# Patient Record
Sex: FEMALE | Race: WHITE | Employment: STUDENT | URBAN - METROPOLITAN AREA
[De-identification: names, ages, dates, MRNs, and addresses within clinical notes are randomized per-mention and may not be internally consistent; named-entity substitution may affect disease eponyms.]

---
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
## 2025-02-17 ENCOUNTER — APPOINTMENT (OUTPATIENT)
Dept: BEHAVIORAL HEALTH | Facility: CLINIC | Age: 20
End: 2025-02-17
Payer: COMMERCIAL

## 2025-02-17 DIAGNOSIS — F90.2 ATTENTION DEFICIT HYPERACTIVITY DISORDER (ADHD), COMBINED TYPE: ICD-10-CM

## 2025-02-17 DIAGNOSIS — F32.0 CURRENT MILD EPISODE OF MAJOR DEPRESSIVE DISORDER WITHOUT PRIOR EPISODE (CMS-HCC): ICD-10-CM

## 2025-02-17 DIAGNOSIS — F95.2 TOURETTE'S DISORDER: ICD-10-CM

## 2025-02-17 DIAGNOSIS — F41.9 ANXIETY: ICD-10-CM

## 2025-02-17 DIAGNOSIS — F84.0 AUTISTIC DISORDER (HHS-HCC): ICD-10-CM

## 2025-02-17 RX ORDER — METHYLPHENIDATE HYDROCHLORIDE 18 MG/1
18 TABLET ORAL EVERY MORNING
Qty: 30 TABLET | Refills: 0 | Status: SHIPPED | OUTPATIENT
Start: 2025-02-17

## 2025-02-17 NOTE — PROGRESS NOTES
Outpatient IDD Clinic- Follow up Evaluation    Margarita Sandoval is a 19 y.o. year old female patient with a chief complaint of   Chief Complaint   Patient presents with    Autism    ADHD    Anxiety    MDD (Major Depressive Disorder)    Tourette Syndrome    presenting to outpatient treatment for a scheduled psych outpatient psychiatric follow-up.    Virtual or Telephone Consent    An interactive audio and video telecommunication system which permits real time communications between the patient (at the originating site) and provider (at the distant site) was utilized to provide this telehealth service.   Verbal consent was requested and obtained from Margarita Sandoval on this date, 02/17/25 for a telehealth visit and the patient's location was confirmed at the time of the visit.    PRESENT FOR APPOINTMENT  Mom, Margarita Sandoval    HPI:  Margarita is graduating from school in May;  is home schooled by mom. Not currently working, goes to a day program once weekly which is an art program; does painting and drawing, field trips.     Has a hx of POTS, was working but had to stop due to her POTS.    Tics are well controlled currently, clonidine has been helpful. Mom has not noticed tics in the last several months. Margarita will have hand and shoulder movements and vocal tics.    Margarita reports having a hard time doing her school work. Reports difficulty with focus and attention. Was on Focalin and as the dose was increased she had increased agitation. Since stopping Focalin she has returned to baseline. Grades are average; cannot concentrate and got locked out of a program due to inability to concentrate.    Mood depends, fluctuates. Denies SIB.    Denies SI/HI and passive thoughts of death.     Thinks she sleeps good; sometimes has a hard time falling asleep. Unable to state how long it takes, mom thinks it can be a couple of hours.     Margarita will have periods of time where she gets very talkative, will have a decrease in sleep, and more  impulsive. Can last a few days to a week. Gets very agitated with menses, will yell. Had the Nexplanon placed 2/5/25.     Margarita likes to play video games, playing sports and animals. Rides horses at a local stable. She also takes care of her animals; has chickens, bunnies, cats and dogs.     Mom reports Margarita has had numerous ear infections, strep and UTIs in childhood. 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 Within two days of surgery she started having hallucinations and fits of rages right after. Tics got severe during that time. Saw neurology and was sent to a PANDAS specialist. Was on an antibiotic for 1.5 yrs. All sx resolved except for tics and POTS.    Saw a naturopathic provider; Margarita has higher that normal histamine levels and multiple allergies. Has a type of POTS that she might outgrow. Now seeing adult cardiology, passed out 5 times in two months.     Psychiatric Review Of Systems:  Depressive Symptoms: negative  Manic Symptoms: negative  Anxiety Symptoms: General Anxiety Disorder (BRANDEN)BRANDEN Behaviors: manageable  Psychotic Symptoms:  Hx of visual hallucinations. Reports occasional visual hallucinations  Trauma Symptoms: None  Other Symptoms/Concerns:Motor/vocal tics  Delirium/Altered Mental Status Symptoms:Diminished ability to focus, sustain, shift attention    Current Medications:    Current Outpatient Medications:     cloNIDine (Catapres) 0.1 mg tablet, Take half tablet in the morning, half tablet at midday, full tablet at night, Disp: 225 tablet, Rfl: 1    methylphenidate ER (Concerta) 18 mg extended release tablet, Take 1 tablet (18 mg) by mouth once daily in the morning. Do not crush, chew, or split., Disp: 30 tablet, Rfl: 0    sertraline (Zoloft) 100 mg tablet, Take 1 tablet (100 mg) by mouth once daily., Disp: 90 tablet, Rfl: 1    sertraline (Zoloft) 50 mg tablet, Take 1 tablet (50 mg) by mouth once daily., Disp: 90 tablet, Rfl: 1    traZODone (Desyrel) 100 mg tablet, Take 1 tablet (100 mg) by  mouth as needed at bedtime for sleep., Disp: 90 tablet, Rfl: 1    Medical History:  History reviewed. No pertinent past medical history.    Past Psychiatric History:   Diagnoses: Autism, ADHD, tic disorder, anxiety, depresson  Previous Psychiatrist: Dr De Leon; [facility].  Therapy:  Weekly, [facility]  Past psychiatric medications: Risperdal, abilify, tegretol, concerta, impipramine, daytrana, clonidine, straterra, Adderall, Latuda, lithium, Focalin  guanfacine-nausea   Past psychiatric treatments: denies  Hospitalizations: denies  Suicide attempts: denies    Family psychiatric history:  Maternal grandparent- anxiety, depression  Father- anxiety  Paternal grandparents- alcohol abuse    Social History:   Currently lives: At home,lives with siblings and parents  Parents:   Siblings: one younger sister 17, two older brothers 22,24  Birth: Margarita was FT infant, .   Development: Walking and talking was not delayed, has processing delays and reading was delayed.   School: Home schooled  Occupations: Worked a [employer], not employed currently  Relationship: engaged  History of violence: had physical aggression after surgery when having hallucinations  Access to Weapons: has firearms at home but locked up  Guardian/POA/Payee:  Self, mom POA    Substance Use History:  Tobacco use: denies  Use of alcohol: denied  Use of caffeine: coffee 1 /day and occasional soft drink  Use of other substances: denies    Record Review: moderate     Medical Review Of Systems:  A comprehensive review of systems was negative except for: Respiratory: positive for Symptoms; Respiratory w/o Neg: cough  Gastrointestinal: positive for dyspepsia    MEDICAL HISTORY  -PCP: Cally Elliott, PhD  -Pt reports currently is not pregnant and uses birth control. Nexplanon. LMP: 25    -TBI/head trauma/LOC/seizure hx: hx concussion at 3 yo and 3 yo    Objective   Mental Status Exam  Appearance: Margarita has multi  colored hair pulled up and wears glasses. She is neat and clean in appearance  Attitude: Calm, guarded   Behavior: Intermittent eye contact.   Motor Activity: No psychomotor agitation or retardation. No abnormal movements, tremors or tics noted during visit. No evidence of extrapyramidal symptoms or tardive dyskinesia.  Speech: Regular rate, rhythm, volume. Spontaneous, no pressured speech.  Mood: fluctuates  Affect: restricted, guarded, mood congruent.  Thought Process: Linear, logical, and goal-directed. No loose associations or gross thought disorganization.  Thought Content: Denied current suicidal ideation or thoughts of harm to self, denied homicidal ideation or thoughts of harm to others. No delusional thinking elicited. No perseverations or obsessions identified.   Perception: Did not endorse auditory or visual hallucinations, did not appear to be responding to hallucinatory stimuli.   Cognition: Alert, oriented x3. Adequate fund of knowledge. No deficits in language.   Insight: Fair, in regards to understanding mental health condition  Judgement: Fair      Vitals:  There were no vitals filed for this visit.    Risk Assessment:  Risk of harm to self: Low Risk -- Risk factors include: History of impulsivity and/or aggressive behavior  and Severe anxiety Protective factors include:Denies current suicidal ideation, Denies history of suicide attempts , Willingness to seek help and support , Gender, Receiving and engaged in care for mental, physical, and substance use disorders , History of adhering to treatment recommendations and/or prescribed medication regimen , Support through ongoing medical and mental healthcare relationships , Interpersonal relationships and supports, e.g., family, friends, peers, community , and Restricted access to firearms or other lethal means of suicide     Risk of harm to others: Medium Risk - Risk factors include: No significant risk factors identified on screening. Protective  factors include: Lack of known history of harm to others , Lack of known history of violent ideation , and Lack of known access to firearms       Margarita was seen today for autism, adhd, anxiety, mdd (major depressive disorder) and tourette syndrome.  Diagnoses and all orders for this visit:  Autistic disorder ([facility])  -     Follow Up In Psychiatry  -     Follow Up In Psychiatry; Future  Attention deficit hyperactivity disorder (ADHD), combined type  -     methylphenidate ER (Concerta) 18 mg extended release tablet; Take 1 tablet (18 mg) by mouth once daily in the morning. Do not crush, chew, or split.  Tourette's disorder  Anxiety  Current mild episode of major depressive disorder without prior episode (CMS-HCC)       Medical Decision Making:   Margarita Sandoval is a 19 y.o. female with ADHD, anxiety ASD, Tic Disorder and concern for PANDAs who was seen today to establish care with a new provider. Was previously seen by Dr eD Leon through the IDD clinic.   Margarita reports difficulty with concentration and focus; she was trialed on various stimulant medications which increased agitation. She is currently taking clonidine 0.1 mg one half tab in the morning, one half tab at midday, and 1 full tab at night for tic disorder, Zoloft 150 mg daily for anxiety and depression, and trazodone 100 mg at bedtime for sleep.  Margarita has a known history of psychogenic seizures, last one was when she was working at [employer].  She is stable on current medication regimen.   Discussed treatment plan, including trialing Concerta for ADHD symptoms.  She was most recently on Focalin which caused increased agitation.  Will start Concerta ER 18 mg daily and continue remaining medication regimen.  Plan to follow-up in 6 weeks.    Impression:   ASD  ADHD  Anxiety  Tic disorder vs. Tourette's syndrome  History psychogenic seizures  History of depression    Plan/Recommendations:  Medications:    -Start Concerta ER 18 mg daily for ADHD   -Continue  clonidine 0.1 mg one half tab in the morning, one half tab at midday, and 1 full tab at bedtime Tic disorder   -Continue sertraline 150 mg daily for anxiety and depression   -Continue trazodone 100 mg nightly as needed for sleep  Orders: Go to any  lab for blood work to be completed  Follow up: In 6 weeks as scheduled  Call  Psychiatry at [phone] with issues.  For [facility] residents, [employer] is a 24/7 hotline you can call for assistance [193.507.7130]. Please call [phone] or go to your closest Emergency Room if you feel unsafe. This includes thoughts of hurting yourself or anyone else, or having other troubles such as hearing voices, seeing visions, or having new and scary thoughts about the people around you.    Review with patient: Treatment plan reviewed with the patient.  Medication risks/benefit reviewed with the patient and mom    Time Spent:  Prep time: 11 min  Direct patient time: 55 min  Documentation time: 12 min   Total time: 78 min    KENYATTA Suresh-CNP

## 2025-02-18 ENCOUNTER — APPOINTMENT (OUTPATIENT)
Dept: BEHAVIORAL HEALTH | Facility: CLINIC | Age: 20
End: 2025-02-18
Payer: COMMERCIAL

## 2025-02-19 ENCOUNTER — TELEPHONE (OUTPATIENT)
Dept: BEHAVIORAL HEALTH | Facility: CLINIC | Age: 20
End: 2025-02-19

## 2025-02-19 NOTE — PROGRESS NOTES
PROVIDER: mohan  MEDICATION/DOSAGE: methlphenidate HCI ER 18mg  QTY/SUPPLY: 30/30  PRIOR AUTH COMPLETED VIA: via phone  INSURANCE:  [employer]  REF #/KEY:  STATUS approved  BIN#               [id number]   ID#                      [id number]

## 2025-03-02 NOTE — PATIENT INSTRUCTIONS
Plan/Recommendations:  Medications:    -Start Concerta ER 18 mg daily for ADHD   -Continue clonidine 0.1 mg one half tab in the morning, one half tab at midday, and 1 full tab at bedtime Tic disorder   -Continue sertraline 150 mg daily for anxiety and depression   -Continue trazodone 100 mg nightly as needed for sleep  Orders: Go to any  lab for blood work to be completed  Follow up: In 6 weeks as scheduled  Call  Psychiatry at [phone] with issues.  For [facility] residents, [employer] is a 24/7 hotline you can call for assistance [509.645.6668]. Please call [phone] or go to your closest Emergency Room if you feel unsafe. This includes thoughts of hurting yourself or anyone else, or having other troubles such as hearing voices, seeing visions, or having new and scary thoughts about the people around you.

## 2025-04-16 ENCOUNTER — APPOINTMENT (OUTPATIENT)
Dept: BEHAVIORAL HEALTH | Facility: CLINIC | Age: 20
End: 2025-04-16

## 2025-04-16 DIAGNOSIS — F84.0 AUTISTIC DISORDER (HHS-HCC): ICD-10-CM

## 2025-04-16 DIAGNOSIS — F41.9 ANXIETY: ICD-10-CM

## 2025-04-16 DIAGNOSIS — F95.2 TOURETTE'S DISORDER: ICD-10-CM

## 2025-04-16 DIAGNOSIS — F90.2 ATTENTION DEFICIT HYPERACTIVITY DISORDER (ADHD), COMBINED TYPE: ICD-10-CM

## 2025-04-16 PROCEDURE — 99215 OFFICE O/P EST HI 40 MIN: CPT

## 2025-04-16 RX ORDER — CLONIDINE HYDROCHLORIDE 0.1 MG/1
TABLET ORAL
Qty: 225 TABLET | Refills: 1 | Status: SHIPPED | OUTPATIENT
Start: 2025-04-16

## 2025-04-16 RX ORDER — SERTRALINE HYDROCHLORIDE 100 MG/1
100 TABLET, FILM COATED ORAL DAILY
Qty: 90 TABLET | Refills: 1 | Status: SHIPPED | OUTPATIENT
Start: 2025-04-16 | End: 2025-10-13

## 2025-04-16 RX ORDER — TRAZODONE HYDROCHLORIDE 100 MG/1
100 TABLET ORAL NIGHTLY PRN
Qty: 90 TABLET | Refills: 1 | Status: SHIPPED | OUTPATIENT
Start: 2025-04-16 | End: 2025-10-13

## 2025-04-16 RX ORDER — SERTRALINE HYDROCHLORIDE 50 MG/1
50 TABLET, FILM COATED ORAL DAILY
Qty: 90 TABLET | Refills: 1 | Status: SHIPPED | OUTPATIENT
Start: 2025-04-16 | End: 2025-10-13

## 2025-04-16 RX ORDER — METHYLPHENIDATE HYDROCHLORIDE 27 MG/1
27 TABLET ORAL EVERY MORNING
Qty: 30 TABLET | Refills: 0 | Status: SHIPPED | OUTPATIENT
Start: 2025-04-16

## 2025-04-16 NOTE — PROGRESS NOTES
Outpatient [facility]- Follow up Evaluation    Margarita Sandoval is a 19 y.o. year old female patient with a chief complaint of   No chief complaint on file.   presenting to outpatient treatment for a scheduled psych outpatient psychiatric follow-up.    Virtual or Telephone Consent    An interactive audio and video telecommunication system which permits real time communications between the patient (at the originating site) and provider (at the distant site) was utilized to provide this telehealth service.   Verbal consent was requested and obtained from Margarita Sandoval on this date, 04/16/25 for a telehealth visit and the patient's location was confirmed at the time of the visit.      PRESENT FOR APPOINTMENT  Dad, Toribio Leemarvin    HPI:  Margarita states she doesn't know how things are going.  She is due to graduate in May.     Was started on Concerta at last visit; noticed a little bit of improvement with focus and attention. Denies agitation on Concerta at the starter dose but has since run out. Denies side effects.     Last couple of weeks dad reports Margarita has been really edgy, jumps to anger quickly. Might have been different on Concerta, was not as defiant.     Denies depressive sx or Denies SI/HI and passive thoughts of death.     Denies problems with anxiety.     Sleep is fine; denies difficulty falling and staying asleep.     Working with [employer] on job search and placement. Has not done job exploration or experience yet.    Tics well controlled with clonidine but ran out of her supply of medication.     Has not had any issues with passing out from POTS; saw cardiology, still going through testing.       Per previous HPI:  Margarita is graduating from school in May;  is home schooled by mom. Not currently working, goes to a day program once weekly which is an art program; does painting and drawing, field trips.     Has a hx of POTS, was working but had to stop due to her POTS.    Tics are well controlled currently, clonidine  has been helpful. Mom has not noticed tics in the last several months. Margarita will have hand and shoulder movements and vocal tics.    Margarita reports having a hard time doing her school work. Reports difficulty with focus and attention. Was on Focalin and as the dose was increased she had increased agitation. Since stopping Focalin she has returned to baseline. Grades are average; cannot concentrate and got locked out of a program due to inability to concentrate.    Mood depends, fluctuates. Denies SIB.    Denies SI/HI and passive thoughts of death.     Thinks she sleeps good; sometimes has a hard time falling asleep. Unable to state how long it takes, mom thinks it can be a couple of hours.     Margarita will have periods of time where she gets very talkative, will have a decrease in sleep, and more impulsive. Can last a few days to a week. Gets very agitated with menses, will yell. Had the Nexplanon placed 2/5/25.     Margarita likes to play video games, playing sports and animals. Rides horses at a local stable. She also takes care of her animals; has chickens, bunnies, cats and dogs.     Mom reports Margarita has had numerous ear infections, strep and UTIs in childhood. 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 Within two days of surgery she started having hallucinations and fits of rages right after. Tics got severe during that time. Saw neurology and was sent to a PANDAS specialist. Was on an antibiotic for 1.5 yrs. All sx resolved except for tics and POTS.    Saw a naturopathic provider; Margarita has higher that normal histamine levels and multiple allergies. Has a type of POTS that she might outgrow. Now seeing adult cardiology, passed out 5 times in two months.     Psychiatric Review Of Systems:  Depressive Symptoms: negative  Manic Symptoms: negative  Anxiety Symptoms: General Anxiety Disorder (BRANDEN)BRANDEN Behaviors: manageable  Psychotic Symptoms:  Hx of visual hallucinations. Reports occasional visual hallucinations  Trauma  Symptoms: None  Other Symptoms/Concerns:Motor/vocal tics  Delirium/Altered Mental Status Symptoms:Diminished ability to focus, sustain, shift attention    Current Medications:    Current Outpatient Medications:     cloNIDine (Catapres) 0.1 mg tablet, Take half tablet in the morning, half tablet at midday, full tablet at night, Disp: 225 tablet, Rfl: 1    methylphenidate ER (Concerta) 18 mg extended release tablet, Take 1 tablet (18 mg) by mouth once daily in the morning. Do not crush, chew, or split., Disp: 30 tablet, Rfl: 0    sertraline (Zoloft) 100 mg tablet, Take 1 tablet (100 mg) by mouth once daily., Disp: 90 tablet, Rfl: 1    sertraline (Zoloft) 50 mg tablet, Take 1 tablet (50 mg) by mouth once daily., Disp: 90 tablet, Rfl: 1    traZODone (Desyrel) 100 mg tablet, Take 1 tablet (100 mg) by mouth as needed at bedtime for sleep., Disp: 90 tablet, Rfl: 1    Medical History:  No past medical history on file.    Past Psychiatric History:   Diagnoses: Autism, ADHD, tic disorder, anxiety, depresson  Previous Psychiatrist: Dr De Leon; Cj; [facility] and [facility].  Therapy:  Weekly, [facility] [facility]  Past psychiatric medications: Risperdal, abilify, tegretol, concerta, impipramine, daytrana, clonidine, straterra, Adderall, Latuda, lithium, Focalin  guanfacine-nausea   Past psychiatric treatments: denies  Hospitalizations: denies  Suicide attempts: denies    Family psychiatric history:  Maternal grandparent- anxiety, depression  Father- anxiety  Paternal grandparents- alcohol abuse    Social History:   Currently lives: At home,lives with siblings and parents  Parents:   Siblings: one younger sister 17, two older brothers 22,24  Birth: Margarita was [employer] infant, .   Development: Walking and talking was not delayed, has processing delays and reading was delayed.   School: Home schooled  Occupations: Worked a [employer], not employed currently  Relationship: engaged  History of violence: had physical aggression  "after surgery when having hallucinations  Access to Weapons: has firearms at home but locked up  Guardian/POA/Payee:  Self, mom POA    Substance Use History:  Tobacco use: denies  Use of alcohol: denied  Use of caffeine: coffee 1 /day and occasional soft drink  Use of other substances: denies    Record Review: brief     Medical Review Of Systems:  A comprehensive review of systems was negative except for: Respiratory: positive for Symptoms; Respiratory w/o Neg: cough  Gastrointestinal: positive for diarrhea and dyspepsia    MEDICAL HISTORY  -PCP: Cally Elliott, PhD  -Pt reports currently is not pregnant and uses birth control. Nexplanon. LMP: 4/15/25    -TBI/head trauma/LOC/seizure hx: hx concussion at 3 yo and 3 yo    Objective   Mental Status Exam  Appearance: Margarita has multi colored hair pulled up and wears glasses. She is neat and clean in appearance  Attitude: Calm; will get up and leave room at times  Behavior: Intermittent eye contact.   Motor Activity: No psychomotor agitation or retardation. No abnormal movements, tremors or tics noted during visit. No evidence of extrapyramidal symptoms or tardive dyskinesia.  Speech: Regular rate, rhythm, volume. Spontaneous, no pressured speech.  Mood: \"OK\"  Affect: restricted, mood congruent.  Thought Process: Linear, logical, and goal-directed. No loose associations or gross thought disorganization.  Thought Content: Denied current suicidal ideation or thoughts of harm to self, denied homicidal ideation or thoughts of harm to others. No delusional thinking elicited. No perseverations or obsessions identified.   Perception: Did not endorse auditory or visual hallucinations, did not appear to be responding to hallucinatory stimuli.   Cognition: Alert, oriented x3. Adequate fund of knowledge. No deficits in language.   Insight: Fair, in regards to understanding mental health condition  Judgement: Fair      Vitals:  There were no vitals filed for this visit.    Risk " Assessment:  Risk of harm to self: Low Risk -- Risk factors include: History of impulsivity and/or aggressive behavior  and Severe anxiety Protective factors include:Denies current suicidal ideation, Denies history of suicide attempts , Willingness to seek help and support , Gender, Receiving and engaged in care for mental, physical, and substance use disorders , History of adhering to treatment recommendations and/or prescribed medication regimen , Support through ongoing medical and mental healthcare relationships , Interpersonal relationships and supports, e.g., family, friends, peers, community , and Restricted access to firearms or other lethal means of suicide     Risk of harm to others: Medium Risk - Risk factors include: No significant risk factors identified on screening. Protective factors include: Lack of known history of harm to others , Lack of known history of violent ideation , and Lack of known access to firearms       There are no diagnoses linked to this encounter.       Medical Decision Making:   Margarita Sandoval is a 19 y.o. female with ADHD, anxiety ASD, Tic Disorder and concern for PANDAs.  Margarita was started on Concerta at last visit for ADHD. She is prescribed clonidine 0.1 mg one half tab in the morning, one half tab at midday, and 1 full tab at night for tic disorder, Zoloft 150 mg daily for anxiety and depression, and trazodone 100 mg at bedtime for sleep. Margarita denies sx of depression and anxiety; dad reports mood is more on edge.  Is hard for Margarita to say whether Concerta was helpful; dad felt that maybe there was some slight improvement.  She did not have any agitation or side effects with Concerta.  Discussed treatment plan; will increase Concerta to 27 mg daily and continue remaining medication regimen.  Plan to follow-up in 6 weeks.    Impression:   ASD  ADHD  Anxiety  Tic disorder vs. Tourette's syndrome  History psychogenic seizures  History of  depression    Plan/Recommendations:  Medications:    - Increase Concerta ER to 27 mg daily for ADHD   -Continue clonidine 0.1 mg one half tab in the morning, one half tab at midday, and 1 full tab at bedtime Tic disorder   -Continue sertraline 150 mg daily for anxiety and depression   -Continue trazodone 100 mg nightly as needed for sleep  Orders: Go to any  lab for blood work and urine tox screen to be completed  Follow up: In May as scheduled  Call  Psychiatry at [phone] with issues.  For [facility] residents, [facility] is a 24/7 hotline you can call for assistance [[phone]]. Please call [phone] or go to your closest Emergency Room if you feel unsafe. This includes thoughts of hurting yourself or anyone else, or having other troubles such as hearing voices, seeing visions, or having new and scary thoughts about the people around you.    Review with patient: Treatment plan reviewed with the patient.  Medication risks/benefit reviewed with the patient and dad    Time Spent:  Prep time: 2 min  Direct patient time: 32 min  Documentation time: 10 min   Total time: 44 min    KENYATTA Suresh-CNP

## 2025-04-20 NOTE — PATIENT INSTRUCTIONS
Plan/Recommendations:  Medications:    - Increase Concerta ER to 27 mg daily for ADHD   -Continue clonidine 0.1 mg one half tab in the morning, one half tab at midday, and 1 full tab at bedtime Tic disorder   -Continue sertraline 150 mg daily for anxiety and depression   -Continue trazodone 100 mg nightly as needed for sleep  Orders: Go to any  lab for blood work and urine tox screen to be completed  Follow up: In May as scheduled  Call  Psychiatry at [phone] with issues.  For [facility] residents, [facility] is a 24/7 hotline you can call for assistance [[phone]]. Please call [phone] or go to your closest Emergency Room if you feel unsafe. This includes thoughts of hurting yourself or anyone else, or having other troubles such as hearing voices, seeing visions, or having new and scary thoughts about the people around you.

## 2025-05-21 ENCOUNTER — APPOINTMENT (OUTPATIENT)
Dept: BEHAVIORAL HEALTH | Facility: CLINIC | Age: 20
End: 2025-05-21

## 2025-05-21 DIAGNOSIS — F90.2 ATTENTION DEFICIT HYPERACTIVITY DISORDER (ADHD), COMBINED TYPE: ICD-10-CM

## 2025-05-21 DIAGNOSIS — G90.9 AUTONOMIC DYSFUNCTION: ICD-10-CM

## 2025-05-21 DIAGNOSIS — F32.0 CURRENT MILD EPISODE OF MAJOR DEPRESSIVE DISORDER WITHOUT PRIOR EPISODE: ICD-10-CM

## 2025-05-21 DIAGNOSIS — F41.9 ANXIETY: ICD-10-CM

## 2025-05-21 DIAGNOSIS — F84.0 AUTISTIC DISORDER (HHS-HCC): ICD-10-CM

## 2025-05-21 DIAGNOSIS — F95.2 TOURETTE'S DISORDER: ICD-10-CM

## 2025-05-21 PROBLEM — J45.30: Status: ACTIVE | Noted: 2025-01-02

## 2025-05-21 PROBLEM — R00.2 PALPITATIONS: Status: ACTIVE | Noted: 2025-02-20

## 2025-05-21 PROCEDURE — 99215 OFFICE O/P EST HI 40 MIN: CPT

## 2025-05-21 RX ORDER — FAMOTIDINE 20 MG/1
20 TABLET, FILM COATED ORAL DAILY
COMMUNITY

## 2025-05-21 RX ORDER — OMEPRAZOLE 20 MG/1
20 TABLET, DELAYED RELEASE ORAL
COMMUNITY

## 2025-05-21 RX ORDER — METHYLPHENIDATE HYDROCHLORIDE 27 MG/1
27 TABLET ORAL EVERY MORNING
Qty: 30 TABLET | Refills: 0 | Status: SHIPPED | OUTPATIENT
Start: 2025-05-21

## 2025-05-21 RX ORDER — IBUPROFEN 200 MG
400 TABLET ORAL DAILY
COMMUNITY

## 2025-05-21 RX ORDER — VIT C/E/ZN/COPPR/LUTEIN/ZEAXAN 250MG-90MG
50 CAPSULE ORAL DAILY
COMMUNITY

## 2025-05-21 RX ORDER — LANOLIN ALCOHOL/MO/W.PET/CERES
1000 CREAM (GRAM) TOPICAL DAILY
COMMUNITY

## 2025-05-21 RX ORDER — VIT C/E/ZN/COPPR/LUTEIN/ZEAXAN 250MG-90MG
25 CAPSULE ORAL DAILY
COMMUNITY
End: 2025-05-21 | Stop reason: DRUGHIGH

## 2025-05-21 RX ORDER — METHYLPHENIDATE HYDROCHLORIDE 27 MG/1
27 TABLET ORAL DAILY
Qty: 30 TABLET | Refills: 0 | Status: SHIPPED | OUTPATIENT
Start: 2025-06-20

## 2025-05-21 RX ORDER — SERTRALINE HYDROCHLORIDE 50 MG/1
TABLET, FILM COATED ORAL
Qty: 90 TABLET | Refills: 1 | Status: SHIPPED | OUTPATIENT
Start: 2025-05-21

## 2025-05-21 RX ORDER — MIDODRINE HYDROCHLORIDE 2.5 MG/1
2.5 TABLET ORAL 2 TIMES DAILY
COMMUNITY

## 2025-05-21 RX ORDER — LEVOCETIRIZINE DIHYDROCHLORIDE 5 MG/1
5 TABLET, FILM COATED ORAL EVERY EVENING
COMMUNITY

## 2025-05-21 RX ORDER — METHYLPHENIDATE HYDROCHLORIDE 27 MG/1
27 TABLET ORAL DAILY
Qty: 30 TABLET | Refills: 0 | Status: SHIPPED | OUTPATIENT
Start: 2025-07-20

## 2025-05-21 RX ORDER — ALBUTEROL SULFATE 90 UG/1
2 INHALANT RESPIRATORY (INHALATION) EVERY 4 HOURS PRN
COMMUNITY
Start: 2025-04-28

## 2025-05-21 NOTE — PROGRESS NOTES
Outpatient IDD Clinic- Follow up Evaluation    Margarita Sandoval is a 19 y.o. year old female patient with a chief complaint of   Chief Complaint   Patient presents with    Autism    ADHD    MDD (Major Depressive Disorder)    Anxiety    Tourette Syndrome    presenting to outpatient treatment for a scheduled psych outpatient psychiatric follow-up.    Virtual or Telephone Consent    An interactive audio and video telecommunication system which permits real time communications between the patient (at the originating site) and provider (at the distant site) was utilized to provide this telehealth service.   Verbal consent was requested and obtained from Margarita Sandoval on this date, 05/21/25 for a telehealth visit and the patient's location was confirmed at the time of the visit.      PRESENT FOR APPOINTMENT  Dad, Anjana Leemarvin    HPI:  Margarita auditioned for a role in a play in the [employer]. Did not get a speaking role and was disappointed. Got a role in the ensemble. Mom states it has been apparent they are not giving individual's with DD speaking roles.     Concerta was increased at last visit; thinks it works. Attention is a bit better, still struggles with completing tasks.  Hyperfocuses on things she likes but unable to attend to undesired tasks.     Feels anxiety has gotten worse.     Mood changes regularly, both happy moments and sad moments.     Sleeping well; Denies difficulty falling and staying asleep.     First appt with [employer] next week, and has been applying for jobs.     Tics have been well controlled.       Per previous HPI:  Margarita states she doesn't know how things are going.  She is due to graduate in May.     Was started on Concerta at last visit; noticed a little bit of improvement with focus and attention. Denies agitation on Concerta at the starter dose but has since run out. Denies side effects.     Last couple of weeks dad reports Margarita has been really edgy, jumps to anger quickly.  Might have been different on Concerta, was not as defiant.     Denies depressive sx or Denies SI/HI and passive thoughts of death.     Denies problems with anxiety.     Sleep is fine; denies difficulty falling and staying asleep.     Working with [employer] on job search and placement. Has not done job exploration or experience yet.    Tics well controlled with clonidine but ran out of her supply of medication.     Has not had any issues with passing out from POTS; saw cardiology, still going through testing.       Per initial HPI: (2/17/25):  Margarita is graduating from school in May;  is home schooled by mom. Not currently working, goes to a day program once weekly which is an art program; does painting and drawing, field trips.     Has a hx of POTS, was working but had to stop due to her POTS.    Tics are well controlled currently, clonidine has been helpful. Mom has not noticed tics in the last several months. Margarita will have hand and shoulder movements and vocal tics.    Margarita reports having a hard time doing her school work. Reports difficulty with focus and attention. Was on Focalin and as the dose was increased she had increased agitation. Since stopping Focalin she has returned to baseline. Grades are average; cannot concentrate and got locked out of a program due to inability to concentrate.    Mood depends, fluctuates. Denies SIB.    Denies SI/HI and passive thoughts of death.     Thinks she sleeps good; sometimes has a hard time falling asleep. Unable to state how long it takes, mom thinks it can be a couple of hours.     Margarita will have periods of time where she gets very talkative, will have a decrease in sleep, and more impulsive. Can last a few days to a week. Gets very agitated with menses, will yell. Had the Nexplanon placed 2/5/25.     Margarita likes to play video games, playing sports and animals. Rides horses at a local stable. She also takes care of her animals; has chickens, bunnies, cats and dogs.     Mom  reports Margarita has had numerous ear infections, strep and UTIs in childhood. 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 Within two days of surgery she started having hallucinations and fits of rages right after. Tics got severe during that time. Saw neurology and was sent to a PANDAS specialist. Was on an antibiotic for 1.5 yrs. All sx resolved except for tics and POTS.    Saw a naturopathic provider; Margarita has higher that normal histamine levels and multiple allergies. Has a type of POTS that she might outgrow. Now seeing adult cardiology, passed out 5 times in two months.     Psychiatric Review Of Systems:  Depressive Symptoms: negative  Manic Symptoms: negative  Anxiety Symptoms: General Anxiety Disorder (BRANDEN)BRANDEN Behaviors: excessive anxiety/worry  Psychotic Symptoms: Hx of visual hallucinations.  Trauma Symptoms: None  Other Symptoms/Concerns:Motor/vocal tics  Delirium/Altered Mental Status Symptoms:Diminished ability to focus, sustain, shift attention    Current Medications:    Current Outpatient Medications:     albuterol 90 mcg/actuation inhaler, Inhale 2 puffs every 4 hours if needed for wheezing or shortness of breath., Disp: , Rfl:     cholecalciferol (Vitamin D-3) 25 mcg (1,000 units) capsule, Take 2 capsules (50 mcg) by mouth once daily., Disp: , Rfl:     cyanocobalamin (Vitamin B-12) 1,000 mcg tablet, Take 1 tablet (1,000 mcg) by mouth once daily., Disp: , Rfl:     famotidine (Pepcid) 20 mg tablet, Take 1 tablet (20 mg) by mouth once daily., Disp: , Rfl:     ibuprofen 200 mg tablet, Take 2 tablets (400 mg) by mouth once daily., Disp: , Rfl:     levocetirizine (Xyzal) 5 mg tablet, Take 1 tablet (5 mg) by mouth once daily in the evening., Disp: , Rfl:     omeprazole OTC (PriLOSEC OTC) 20 mg EC tablet, Take 1 tablet (20 mg) by mouth once daily in the morning. Take before meals. Do not crush, chew, or split., Disp: , Rfl:     cloNIDine (Catapres) 0.1 mg tablet, Take half tablet in the morning, half tablet at  midday, full tablet at night, Disp: 225 tablet, Rfl: 1    methylphenidate ER (CONCERTA) 27 mg oral extended release tablet, Take 1 tablet (27 mg) by mouth once daily in the morning. Do not crush, chew, or split., Disp: 30 tablet, Rfl: 0    midodrine (Proamatine) 2.5 mg tablet, Take 1 tablet (2.5 mg) by mouth 2 times a day., Disp: , Rfl:     sertraline (Zoloft) 50 mg tablet, Start 1/2 tablet daily for two weeks then increase to one full tablet daily for anxiety, Disp: 90 tablet, Rfl: 1    traZODone (Desyrel) 100 mg tablet, Take 1 tablet (100 mg) by mouth as needed at bedtime for sleep., Disp: 90 tablet, Rfl: 1    Medical History:  History reviewed. No pertinent past medical history.    Past Psychiatric History:   Diagnoses: Autism, ADHD, tic disorder, anxiety, depresson  Previous Psychiatrist: Dr De Leon; Cj; [facility]  Therapy:  Weekly, [facility] online  Past psychiatric medications: Risperdal, abilify, tegretol, concerta, impipramine, daytrana, clonidine, straterra, Adderall, Latuda, lithium, Focalin  guanfacine-nausea   Past psychiatric treatments: denies  Hospitalizations: denies  Suicide attempts: denies    Family psychiatric history:  Maternal grandparent- anxiety, depression  Father- anxiety  Paternal grandparents- alcohol abuse    Social History:   Currently lives: At home,lives with siblings and parents  Parents:   Siblings: one younger sister 17, two older brothers 22,24  Birth: Margarita was [employer] infant, .   Development: Walking and talking was not delayed, has processing delays and reading was delayed.   School: Home schooled  Occupations: Worked a [employer], not employed currently  Relationship: engaged  History of violence: had physical aggression after surgery when having hallucinations  Access to Weapons: has firearms at home but locked up  Guardian/POA/Payee:  Self, mom POA    Substance Use History:  Tobacco use: denies  Use of alcohol: denied  Use of caffeine: coffee 1  "/day and occasional soft drink  Use of other substances: denies    Record Review: brief     Medical Review Of Systems:  A comprehensive review of systems was negative except for: Gastrointestinal: positive for dyspepsia    MEDICAL HISTORY  -PCP: Cally Elliott, PhD  -Pt reports currently is not pregnant and uses birth control. Nexplanon. LMP: 5/15/25    -TBI/head trauma/LOC/seizuhx concussion at 3 yo and 3 yore hx:     Objective   Mental Status Exam  Appearance: Margarita has multi colored hair pulled up and wears glasses. She is neat and clean in appearance  Attitude: Calm; will get up and leave room at times  Behavior: Intermittent eye contact.   Motor Activity: No psychomotor agitation or retardation. No abnormal movements, tremors or tics noted during visit. No evidence of extrapyramidal symptoms or tardive dyskinesia.  Speech: Regular rate, rhythm, volume. Spontaneous, no pressured speech.  Mood: \"OK\"  Affect: restricted, mood congruent.  Thought Process: Linear, logical, and goal-directed. No loose associations or gross thought disorganization.  Thought Content: Denied current suicidal ideation or thoughts of harm to self, denied homicidal ideation or thoughts of harm to others. No delusional thinking elicited. No perseverations or obsessions identified.   Perception: Did not endorse auditory or visual hallucinations, did not appear to be responding to hallucinatory stimuli.   Cognition: Alert, oriented x3. Adequate fund of knowledge. No deficits in language.   Insight: Fair, in regards to understanding mental health condition  Judgement: Fair      Vitals:  There were no vitals filed for this visit.    Risk Assessment:  Risk of harm to self: Low Risk -- Risk factors include: History of impulsivity and/or aggressive behavior  and Severe anxiety Protective factors include:Denies current suicidal ideation, Denies history of suicide attempts , Willingness to seek help and support , Gender, Receiving and engaged in " care for mental, physical, and substance use disorders , History of adhering to treatment recommendations and/or prescribed medication regimen , Support through ongoing medical and mental healthcare relationships , Interpersonal relationships and supports, e.g., family, friends, peers, community , and Restricted access to firearms or other lethal means of suicide     Risk of harm to others: Medium Risk - Risk factors include: No significant risk factors identified on screening. Protective factors include: Lack of known history of harm to others , Lack of known history of violent ideation , and Lack of known access to firearms       Margarita was seen today for autism, adhd, mdd (major depressive disorder), anxiety and tourette syndrome.  Diagnoses and all orders for this visit:  Autistic disorder ([facility])  -     Follow Up In Psychiatry  -     sertraline (Zoloft) 50 mg tablet; Start 1/2 tablet daily for two weeks then increase to one full tablet daily for anxiety  Attention deficit hyperactivity disorder (ADHD), combined type  -     Follow Up In Psychiatry  -     methylphenidate ER (CONCERTA) 27 mg oral extended release tablet; Take 1 tablet (27 mg) by mouth once daily in the morning. Do not crush, chew, or split.  Tourette's disorder  Current mild episode of major depressive disorder without prior episode  Anxiety  -     sertraline (Zoloft) 50 mg tablet; Start 1/2 tablet daily for two weeks then increase to one full tablet daily for anxiety         Medical Decision Making:   Margarita Sandoval is a 19 y.o. female with ADHD, anxiety ASD, Tic Disorder and concern for PANDAs.  Margarita is doing well overall; she is very distracted for this appointment and walking around and eating breakfast. Margarita believes Concerta is helping a little bit.  She has an appointment this week with [employer] for potential job placement.   Reports increased anxiety over the last few weeks; when reviewing medication it was discovered that she has not had  sertraline for the last 6 weeks.  Mom reports she never received sertraline from the mail order pharmacy.  Updated medication list; Margarita is taking ibuprofen daily as well as vitamin D3 and vitamin B12 as suggested by provider for suspected PANDAS.  Discussed treatment plan; will restart sertraline for anxiety at 25 mg for 2 weeks then increase to 50 mg daily.  Will continue remaining medication regimen and plan to follow-up in 2 months.    Impression:   ASD  ADHD  Anxiety  Tic disorder vs. Tourette's syndrome  History psychogenic seizures  History of depression    Plan/Recommendations:  Medications:    - Continue Concerta ER to 27 mg daily for ADHD   -Continue clonidine 0.1 mg one half tab in the morning, one half tab at midday, and 1 full tab at bedtime Tic disorder   -Start sertraline 50 mg 1/2 tab mg daily for two weeks then increase to one tab daily for anxiety and depression   -Continue trazodone 100 mg nightly as needed for sleep  Orders: Go to any  lab for blood work and urine tox screen to be completed  Follow up: In August as scheduled  Call  Psychiatry at [phone] with issues.  Review with patient: Treatment plan reviewed with the patient.  Medication risks/benefit reviewed with the patient and mom    Time Spent:  Prep time: 5 min  Direct patient time: 33 min  Documentation time: 10 min   Total time: 48 min    KENYATTA Suresh-BINH

## 2025-05-21 NOTE — PATIENT INSTRUCTIONS
Plan/Recommendations:  Medications:    - Continue Concerta ER to 27 mg daily for ADHD   -Continue clonidine 0.1 mg one half tab in the morning, one half tab at midday, and 1 full tab at bedtime Tic disorder   -Start sertraline 50 mg 1/2 tab mg daily for two weeks then increase to one tab daily for anxiety and depression   -Continue trazodone 100 mg nightly as needed for sleep  Orders: Go to any  lab for blood work and urine tox screen to be completed  Follow up: In August as scheduled  Call  Psychiatry at [phone] with issues.

## 2025-08-04 ENCOUNTER — APPOINTMENT (OUTPATIENT)
Dept: BEHAVIORAL HEALTH | Facility: CLINIC | Age: 20
End: 2025-08-04

## 2025-08-04 DIAGNOSIS — F90.2 ATTENTION DEFICIT HYPERACTIVITY DISORDER (ADHD), COMBINED TYPE: ICD-10-CM

## 2025-08-04 DIAGNOSIS — F95.2 TOURETTE'S DISORDER: ICD-10-CM

## 2025-08-04 DIAGNOSIS — F84.0 AUTISTIC DISORDER (HHS-HCC): ICD-10-CM

## 2025-08-04 DIAGNOSIS — F32.0 CURRENT MILD EPISODE OF MAJOR DEPRESSIVE DISORDER WITHOUT PRIOR EPISODE: ICD-10-CM

## 2025-08-04 PROCEDURE — 1036F TOBACCO NON-USER: CPT

## 2025-08-04 PROCEDURE — 99215 OFFICE O/P EST HI 40 MIN: CPT

## 2025-08-04 PROCEDURE — 99417 PROLNG OP E/M EACH 15 MIN: CPT

## 2025-08-04 RX ORDER — CLONIDINE HYDROCHLORIDE 0.1 MG/1
TABLET ORAL
Qty: 225 TABLET | Refills: 1 | Status: SHIPPED | OUTPATIENT
Start: 2025-08-04

## 2025-08-04 RX ORDER — TRAZODONE HYDROCHLORIDE 100 MG/1
100 TABLET ORAL NIGHTLY PRN
Qty: 90 TABLET | Refills: 0 | Status: SHIPPED | OUTPATIENT
Start: 2025-08-04 | End: 2025-11-02

## 2025-08-04 NOTE — PROGRESS NOTES
Outpatient [facility]D Clinic- Follow up Evaluation    Margarita Sandoval is a 20 y.o. year old female patient with a chief complaint of   Chief Complaint   Patient presents with    Autism    Anxiety    ADHD    presenting to outpatient treatment for a scheduled psych outpatient psychiatric follow-up.    Virtual or Telephone Consent    An interactive audio and video telecommunication system which permits real time communications between the patient (at the originating site) and provider (at the distant site) was utilized to provide this telehealth service.   Verbal consent was requested and obtained from Margarita Sandoval on this date, 08/06/25 for a telehealth visit and the patient's location was confirmed at the time of the visit.      PRESENT FOR APPOINTMENT  Mom Anjana Sandoval    HPI:  Margarita restarted Zoloft but tic disorder worsened when she increased dose to 50 mg. Mom weaned her back down and off and tics improved again.     Margarita is hypersensitive to discomfort, does not tolerate it well; left appt to take TUMS for heartburn but returned.     Margarita is quick to fly off the handle, is irritable, feels instantly insulted if anyone says anything to her.  Not sure if anxiety is making her angry or still after effects of PANDAS. Mom reports Margarita's personality changed after she develop PANDAS.     Margarita feels mad a lot; will get so mad she will start crying. Got mad today from not being able to find her bag today. Not worse with starting Concerta. Had been going on for at least 6 months.     Has anxiety daily; not sure if all day long. Has anxiety with her room being messy. Doesn't like being yelled at, will increase her anxiety.     Doesn't know what her mood is; denies lingering depressive sx. Denies SI/HI.   Denies AVH.    Mom feels Concerta is at a good dose but Margarita does not want to do tasks, will procrastinate. Then gets mad and anxious when things are not done.    Saw Cardiology; feels POTS is not cardiac related, possibly  neurological.    Sleeping well with trazodone, sleeps through the night.     Per previous HPI:  Margarita auditioned for a role in a play in the [employer]. Did not get a speaking role and was disappointed. Got a role in the ensemble. Mom states it has been apparent they are not giving individual's with DD speaking roles.     Concerta was increased at last visit; thinks it works. Attention is a bit better, still struggles with completing tasks.  Hyperfocuses on things she likes but unable to attend to undesired tasks.     Feels anxiety has gotten worse.     Mood changes regularly, both happy moments and sad moments.     Sleeping well; Denies difficulty falling and staying asleep.     First appt with [facility] next week, and has been applying for jobs.     Tics have been well controlled.     Per initial HPI: (2/17/25):  Margarita is graduating from school in May;  is home schooled by mom. Not currently working, goes to a day program once weekly which is an art program; does painting and drawing, field trips.     Has a hx of POTS, was working but had to stop due to her POTS.    Tics are well controlled currently, clonidine has been helpful. Mom has not noticed tics in the last several months. Margarita will have hand and shoulder movements and vocal tics.    Margarita reports having a hard time doing her school work. Reports difficulty with focus and attention. Was on Focalin and as the dose was increased she had increased agitation. Since stopping Focalin she has returned to baseline. Grades are average; cannot concentrate and got locked out of a program due to inability to concentrate.    Mood depends, fluctuates. Denies SIB.    Denies SI/HI and passive thoughts of death.     Thinks she sleeps good; sometimes has a hard time falling asleep. Unable to state how long it takes, mom thinks it can be a couple of hours.     Margarita will have periods of time where she gets very talkative, will have a decrease in sleep, and more  impulsive. Can last a few days to a week. Gets very agitated with menses, will yell. Had the Nexplanon placed 2/5/25.     Margarita likes to play video games, playing sports and animals. Rides horses at a local stable. She also takes care of her animals; has chickens, bunnies, cats and dogs.     Mom reports Margarita has had numerous ear infections, strep and UTIs in childhood. 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 Within two days of surgery she started having hallucinations and fits of rages right after. Tics got severe during that time. Saw neurology and was sent to a PANDAS specialist. Was on an antibiotic for 1.5 yrs. All sx resolved except for tics and POTS.    Saw a naturopathic provider; Margarita has higher that normal histamine levels and multiple allergies. Has a type of POTS that she might outgrow. Now seeing adult cardiology, passed out 5 times in two months.     Psychiatric Review Of Systems:  Depressive Symptoms: negative  Manic Symptoms: negative  Anxiety Symptoms: General Anxiety Disorder (BRANDEN)BRANDEN Behaviors: excessive anxiety/worry  Psychotic Symptoms: Hx of visual hallucinations.  Trauma Symptoms: None  Other Symptoms/Concerns:Motor/vocal tics  Delirium/Altered Mental Status Symptoms:Diminished ability to focus, sustain, shift attention    Current Medications:    Current Outpatient Medications:     albuterol 90 mcg/actuation inhaler, Inhale 2 puffs every 4 hours if needed for wheezing or shortness of breath., Disp: , Rfl:     cholecalciferol (Vitamin D-3) 25 mcg (1,000 units) capsule, Take 2 capsules (50 mcg) by mouth once daily., Disp: , Rfl:     cloNIDine (Catapres) 0.1 mg tablet, Take half tablet in the morning, half tablet at midday, full tablet at night, Disp: 225 tablet, Rfl: 1    cyanocobalamin (Vitamin B-12) 1,000 mcg tablet, Take 1 tablet (1,000 mcg) by mouth once daily., Disp: , Rfl:     famotidine (Pepcid) 20 mg tablet, Take 1 tablet (20 mg) by mouth once daily., Disp: , Rfl:     ibuprofen 200 mg  tablet, Take 2 tablets (400 mg) by mouth once daily., Disp: , Rfl:     levocetirizine (Xyzal) 5 mg tablet, Take 1 tablet (5 mg) by mouth once daily in the evening., Disp: , Rfl:     methylphenidate ER (CONCERTA) 27 mg oral extended release tablet, Take 1 tablet (27 mg) by mouth once daily in the morning. Do not crush, chew, or split., Disp: 30 tablet, Rfl: 0    methylphenidate ER (CONCERTA) 27 mg oral extended release tablet, Take 1 tablet (27 mg) by mouth once daily. Do not crush, chew, or split. Do not fill before June 20, 2025., Disp: 30 tablet, Rfl: 0    methylphenidate ER (CONCERTA) 27 mg oral extended release tablet, Take 1 tablet (27 mg) by mouth once daily. Do not crush, chew, or split. Do not fill before July 20, 2025., Disp: 30 tablet, Rfl: 0    midodrine (Proamatine) 2.5 mg tablet, Take 1 tablet (2.5 mg) by mouth 2 times a day., Disp: , Rfl:     omeprazole OTC (PriLOSEC OTC) 20 mg EC tablet, Take 1 tablet (20 mg) by mouth once daily in the morning. Take before meals. Do not crush, chew, or split., Disp: , Rfl:     traZODone (Desyrel) 100 mg tablet, Take 1 tablet (100 mg) by mouth as needed at bedtime for sleep., Disp: 90 tablet, Rfl: 0    Medical History:  History reviewed. No pertinent past medical history.    Past Psychiatric History:   Diagnoses: Autism, ADHD, tic disorder, anxiety, depresson  Previous Psychiatrist: Dr De Leon; Cj; [facility]  Therapy:  Weekly, [facility] online  Past psychiatric medications: Risperdal, abilify, tegretol, concerta, impipramine, daytrana, clonidine, straterra, Adderall, Latuda, lithium, Focalin  guanfacine-nausea   Past psychiatric treatments: denies  Hospitalizations: denies  Suicide attempts: denies    Family psychiatric history:  Maternal grandparent- anxiety, depression  Father- anxiety  Paternal grandparents- alcohol abuse    Social History:   Currently lives: At home, lives with siblings and parents  Parents:   Siblings: one younger  "sister 17, two older brothers 22,24  Birth: Margarita was [employer] infant, .   Development: Walking and talking was not delayed, has processing delays and reading was delayed.   School: Home schooled  Occupations: Worked a [employer], not employed currently  Relationship: engaged  History of violence: had physical aggression after surgery when having hallucinations  Access to Weapons: has firearms at home but locked up  Guardian/POA/Payee:  Self, mom POA    Substance Use History:  Tobacco use: denies  Use of alcohol: denied  Use of caffeine: coffee 1 /day and occasional soft drink  Use of other substances: denies    Record Review: brief     Medical Review Of Systems:  A comprehensive review of systems was negative except for: Gastrointestinal: positive for dyspepsia    MEDICAL HISTORY  -PCP: Cally Elliott, PhD  -Pt reports currently is not pregnant and uses birth control. Nexplanon. LMP: 5/15/25    -TBI/head trauma/LOC/seizuhx concussion at 1 yo and 3 yore hx:     Objective   Mental Status Exam  Appearance: Margarita has brown hair pulled up and wears glasses. She is neat and clean in appearance  Attitude: Overall cooperative, will get up and leave room at times  Behavior: Intermittent eye contact.   Motor Activity: No psychomotor agitation or retardation. No abnormal movements, tremors or tics noted during visit. No evidence of extrapyramidal symptoms or tardive dyskinesia.  Speech: Regular rate, rhythm, volume. Spontaneous, no pressured speech.  Mood: \"I get mad a lot\"  Affect: Labile, mood congruent.  Thought Process: Tangential at times,  goal-directed. No loose associations or gross thought disorganization.  Thought Content: Denied current suicidal ideation or thoughts of harm to self, denied homicidal ideation or thoughts of harm to others. No delusional thinking elicited. No perseverations or obsessions identified.   Perception: Did not endorse auditory or visual hallucinations, did not appear to be responding to " hallucinatory stimuli.   Cognition: Alert, oriented x3. Adequate fund of knowledge. No deficits in language.   Insight: Limited, in regards to understanding mental health condition  Judgement: Limited      Vitals:  There were no vitals filed for this visit.    Risk Assessment:  Risk of harm to self: Low Risk -- Risk factors include: History of impulsivity and/or aggressive behavior  and Severe anxiety Protective factors include:Denies current suicidal ideation, Denies history of suicide attempts , Willingness to seek help and support , Gender, Receiving and engaged in care for mental, physical, and substance use disorders , History of adhering to treatment recommendations and/or prescribed medication regimen , Support through ongoing medical and mental healthcare relationships , Interpersonal relationships and supports, e.g., family, friends, peers, community , and Restricted access to firearms or other lethal means of suicide     Risk of harm to others: Medium Risk - Risk factors include: No significant risk factors identified on screening. Protective factors include: Lack of known history of harm to others , Lack of known history of violent ideation , and Lack of known access to firearms       Margarita was seen today for autism, anxiety and adhd.  Diagnoses and all orders for this visit:  Autistic disorder ([facility])  -     Follow Up In Psychiatry  -     traZODone (Desyrel) 100 mg tablet; Take 1 tablet (100 mg) by mouth as needed at bedtime for sleep.  -     Follow Up In Psychiatry; Future  Attention deficit hyperactivity disorder (ADHD), combined type  -     cloNIDine (Catapres) 0.1 mg tablet; Take half tablet in the morning, half tablet at midday, full tablet at night  Tourette's disorder  -     cloNIDine (Catapres) 0.1 mg tablet; Take half tablet in the morning, half tablet at midday, full tablet at night  Current mild episode of major depressive disorder without prior episode         Medical Decision Making:    Margarita Sandoval is a 19 y.o. female with ADHD, anxiety ASD, Tic Disorder and concern for PANDAs.  Margarita was restarted on sertraline at last which exacerbated her tic disorder and she discontinued it. Mood is irritable and labile; Margarita does not know if it is related to anxiety or something else. She has trialed second gen antipsychotics and lithium in the past, mom does not remember that they were helpful. Discussed treatment plan; may consider lamotrigine as a mood stabilizer. Will review with this provider's collaborator and follow up with pt this week. Follow up appt in one month.     Impression:   ASD  ADHD  Anxiety  Tic disorder vs. Tourette's syndrome  History psychogenic seizures  History of depression    Plan/Recommendations:  Medications:    - Continue Concerta ER 27 mg daily for ADHD   -Continue clonidine 0.1 mg one half tab in the morning, one half tab at midday, and 1 full tab at bedtime Tic disorder  -Continue trazodone 100 mg nightly as needed for sleep  Orders: Go to any lab for blood work and urine tox screen to be completed  Follow up: In September as scheduled  Call  Psychiatry at [phone] with issues.    Review with patient: Treatment plan reviewed with the patient.  Medication risks/benefit reviewed with the patient and mom    Time Spent:  Prep time: 2 min  Direct patient time: 48 min  Documentation time: 5 min   Total time: 55 min    KENYATTA Suresh-CNP

## 2025-08-07 NOTE — PATIENT INSTRUCTIONS
Plan/Recommendations:  Medications:    - Continue Concerta ER 27 mg daily for ADHD   -Continue clonidine 0.1 mg one half tab in the morning, one half tab at midday, and 1 full tab at bedtime Tic disorder  -Continue trazodone 100 mg nightly as needed for sleep  Orders: Go to any lab for blood work and urine tox screen to be completed  Follow up: In September as scheduled  Call  Psychiatry at [phone] with issues.

## 2025-08-13 ENCOUNTER — TELEPHONE (OUTPATIENT)
Dept: BEHAVIORAL HEALTH | Facility: CLINIC | Age: 20
End: 2025-08-13

## 2025-08-13 DIAGNOSIS — R45.4 BEHAVIOR-IRRITABILITY: ICD-10-CM

## 2025-08-13 RX ORDER — LAMOTRIGINE 25 MG/1
TABLET ORAL
Qty: 60 TABLET | Refills: 0 | Status: SHIPPED | OUTPATIENT
Start: 2025-08-13 | End: 2025-08-13

## 2025-08-13 RX ORDER — LAMOTRIGINE 25 MG/1
TABLET ORAL
Qty: 60 TABLET | Refills: 0 | Status: SHIPPED | OUTPATIENT
Start: 2025-08-13

## 2025-09-09 ENCOUNTER — APPOINTMENT (OUTPATIENT)
Dept: BEHAVIORAL HEALTH | Facility: CLINIC | Age: 20
End: 2025-09-09